# Patient Record
Sex: FEMALE | Race: WHITE
[De-identification: names, ages, dates, MRNs, and addresses within clinical notes are randomized per-mention and may not be internally consistent; named-entity substitution may affect disease eponyms.]

---

## 2020-06-12 ENCOUNTER — HOSPITAL ENCOUNTER (OUTPATIENT)
Dept: HOSPITAL 62 - LC | Age: 25
Discharge: HOME | End: 2020-06-12
Attending: OBSTETRICS & GYNECOLOGY
Payer: COMMERCIAL

## 2020-06-12 DIAGNOSIS — R10.32: ICD-10-CM

## 2020-06-12 DIAGNOSIS — O99.89: Primary | ICD-10-CM

## 2020-06-12 DIAGNOSIS — Z3A.23: ICD-10-CM

## 2020-06-12 LAB
APPEARANCE UR: (no result)
APTT PPP: YELLOW S
BARBITURATES UR QL SCN: NEGATIVE
BILIRUB UR QL STRIP: NEGATIVE
GLUCOSE UR STRIP-MCNC: NEGATIVE MG/DL
KETONES UR STRIP-MCNC: NEGATIVE MG/DL
METHADONE UR QL SCN: NEGATIVE
NITRITE UR QL STRIP: NEGATIVE
PCP UR QL SCN: NEGATIVE
PH UR STRIP: 7 [PH] (ref 5–9)
PROT UR STRIP-MCNC: NEGATIVE MG/DL
SP GR UR STRIP: 1.01
URINE AMPHETAMINES SCREEN: NEGATIVE
URINE BENZODIAZEPINES SCREEN: NEGATIVE
URINE COCAINE SCREEN: NEGATIVE
URINE MARIJUANA (THC) SCREEN: NEGATIVE
UROBILINOGEN UR-MCNC: NEGATIVE MG/DL (ref ?–2)

## 2020-06-12 PROCEDURE — 81001 URINALYSIS AUTO W/SCOPE: CPT

## 2020-06-12 PROCEDURE — 80307 DRUG TEST PRSMV CHEM ANLYZR: CPT

## 2020-09-02 ENCOUNTER — HOSPITAL ENCOUNTER (OUTPATIENT)
Dept: HOSPITAL 62 - LC | Age: 25
Discharge: HOME | End: 2020-09-02
Attending: OBSTETRICS & GYNECOLOGY
Payer: COMMERCIAL

## 2020-09-02 DIAGNOSIS — O47.03: Primary | ICD-10-CM

## 2020-09-02 DIAGNOSIS — Z3A.35: ICD-10-CM

## 2020-09-02 LAB
ADD MANUAL MICROSCOPIC: YES
APPEARANCE UR: CLEAR
APTT PPP: (no result) S
BARBITURATES UR QL SCN: NEGATIVE
BILIRUB UR QL STRIP: NEGATIVE
GLUCOSE UR STRIP-MCNC: NEGATIVE MG/DL
KETONES UR STRIP-MCNC: NEGATIVE MG/DL
METHADONE UR QL SCN: NEGATIVE
NITRITE UR QL STRIP: NEGATIVE
PCP UR QL SCN: NEGATIVE
PH UR STRIP: 7 [PH] (ref 5–9)
PROT UR STRIP-MCNC: NEGATIVE MG/DL
RBC #/AREA URNS HPF: (no result) /HPF
SP GR UR STRIP: 1
URINE AMPHETAMINES SCREEN: NEGATIVE
URINE BENZODIAZEPINES SCREEN: NEGATIVE
URINE COCAINE SCREEN: NEGATIVE
URINE MARIJUANA (THC) SCREEN: NEGATIVE
UROBILINOGEN UR-MCNC: NEGATIVE MG/DL (ref ?–2)

## 2020-09-02 PROCEDURE — 81001 URINALYSIS AUTO W/SCOPE: CPT

## 2020-09-02 PROCEDURE — 59025 FETAL NON-STRESS TEST: CPT

## 2020-09-02 PROCEDURE — 80307 DRUG TEST PRSMV CHEM ANLYZR: CPT

## 2020-09-02 NOTE — NON STRESS TEST REPORT
=================================================================

Non Stress Test

=================================================================

Datetime Report Generated by CPN: 09/02/2020 20:46

   

   

=================================================================

DEMOGRAPHIC

=================================================================

   

EGA NST:  35.4

   

=================================================================

INDICATION

=================================================================

   

Indication for Study (NST) Other:  Labor Check - Contractions

   

=================================================================

VITAL SIGNS

=================================================================

   

Pulse - NST:  82

RESP - NST:  17

NBPSYS NST:  108

   

=================================================================

MONITORING

=================================================================

   

Monitor Explained:  Monitor Explained; Test Explained; Patient

   Verbalized Understanding

Time on Monitor:  09/02/2020 17:52

Time off Monitor:  09/02/2020 20:13

   

=================================================================

NST INTERVENTIONS

=================================================================

   

NST Interventions:  PO Hydration

Physician Notified NST:  Dr. Bhagat

BABY A:  [id number]

   

=================================================================

BABY A

=================================================================

   

Fetal Movement :  Present

Contraction Frequency :  3-7

FHR Baseline :  130

Accelerations :  15X15

Decelerations :  None

Variability :  Moderate 6-25bpm

NST Review:  Meets Criteria for Reactive NST

NST Review and Verified By :  FRANDY Fuentes RN

NST Results:  Reactive

   

=================================================================

NST REPORT

=================================================================

   

Report Trigger:  Send Report

## 2020-09-21 ENCOUNTER — HOSPITAL ENCOUNTER (OUTPATIENT)
Dept: HOSPITAL 62 - LC | Age: 25
Discharge: HOME | End: 2020-09-21
Attending: OBSTETRICS & GYNECOLOGY
Payer: COMMERCIAL

## 2020-09-21 DIAGNOSIS — Z3A.36: ICD-10-CM

## 2020-09-21 DIAGNOSIS — O36.8130: Primary | ICD-10-CM

## 2020-09-21 DIAGNOSIS — Z02.83: ICD-10-CM

## 2020-09-21 LAB
APPEARANCE UR: CLEAR
APTT PPP: (no result) S
BARBITURATES UR QL SCN: NEGATIVE
BILIRUB UR QL STRIP: NEGATIVE
GLUCOSE UR STRIP-MCNC: NEGATIVE MG/DL
KETONES UR STRIP-MCNC: NEGATIVE MG/DL
METHADONE UR QL SCN: NEGATIVE
NITRITE UR QL STRIP: NEGATIVE
PCP UR QL SCN: NEGATIVE
PH UR STRIP: 8 [PH] (ref 5–9)
PROT UR STRIP-MCNC: NEGATIVE MG/DL
SP GR UR STRIP: 1.01
URINE AMPHETAMINES SCREEN: NEGATIVE
URINE BENZODIAZEPINES SCREEN: NEGATIVE
URINE COCAINE SCREEN: NEGATIVE
URINE MARIJUANA (THC) SCREEN: NEGATIVE
UROBILINOGEN UR-MCNC: NEGATIVE MG/DL (ref ?–2)

## 2020-09-21 PROCEDURE — 80307 DRUG TEST PRSMV CHEM ANLYZR: CPT

## 2020-09-21 PROCEDURE — 84112 EVAL AMNIOTIC FLUID PROTEIN: CPT

## 2020-09-21 PROCEDURE — 59025 FETAL NON-STRESS TEST: CPT

## 2020-09-21 PROCEDURE — 81005 URINALYSIS: CPT

## 2020-09-21 NOTE — NON STRESS TEST REPORT
=================================================================

Non Stress Test

=================================================================

Datetime Report Generated by CPN: 09/21/2020 23:00

   

   

=================================================================

DEMOGRAPHIC

=================================================================

   

EGA NST:  38.2

   

=================================================================

INDICATION

=================================================================

   

Indication for Study (NST) Other:  decreased fetal movement

   

=================================================================

MONITORING

=================================================================

   

Monitor Explained:  Monitor Explained; Test Explained; Patient

   Verbalized Understanding

Time on Monitor:  09/21/2020 21:45

Time off Monitor:  09/21/2020 22:50

NST Duration:  65

   

=================================================================

NST INTERVENTIONS

=================================================================

   

NST Interventions:  PO Hydration; Reposition Patient

Physician Notified NST:  Dr. Rivero

BABY A:  [id number]

   

=================================================================

BABY A

=================================================================

   

Fetal Movement :  Present

Contraction Frequency :  irregular

FHR Baseline :  120

Accelerations :  15X15

Decelerations :  None

Variability :  Moderate 6-25bpm

NST Review:  Meets Criteria for Reactive NST

NST Review and Verified By :  LOREN Bellavaodilia RN

NST Results:  Reactive

   

=================================================================

NST REPORT

=================================================================

   

Report Trigger:  Send Report

## 2020-09-25 ENCOUNTER — HOSPITAL ENCOUNTER (OUTPATIENT)
Dept: HOSPITAL 62 - LC | Age: 25
Discharge: HOME | End: 2020-09-25
Attending: OBSTETRICS & GYNECOLOGY
Payer: COMMERCIAL

## 2020-09-25 DIAGNOSIS — O47.1: Primary | ICD-10-CM

## 2020-09-25 DIAGNOSIS — Z3A.38: ICD-10-CM

## 2020-09-25 LAB
APPEARANCE UR: CLEAR
APTT PPP: (no result) S
BARBITURATES UR QL SCN: NEGATIVE
BILIRUB UR QL STRIP: NEGATIVE
GLUCOSE UR STRIP-MCNC: 50 MG/DL
KETONES UR STRIP-MCNC: NEGATIVE MG/DL
METHADONE UR QL SCN: NEGATIVE
NITRITE UR QL STRIP: NEGATIVE
PCP UR QL SCN: NEGATIVE
PH UR STRIP: 7 [PH] (ref 5–9)
PROT UR STRIP-MCNC: NEGATIVE MG/DL
SP GR UR STRIP: 1
URINE AMPHETAMINES SCREEN: NEGATIVE
URINE BENZODIAZEPINES SCREEN: NEGATIVE
URINE COCAINE SCREEN: NEGATIVE
URINE MARIJUANA (THC) SCREEN: NEGATIVE
UROBILINOGEN UR-MCNC: NEGATIVE MG/DL (ref ?–2)

## 2020-09-25 PROCEDURE — 84112 EVAL AMNIOTIC FLUID PROTEIN: CPT

## 2020-09-25 PROCEDURE — 81005 URINALYSIS: CPT

## 2020-09-25 PROCEDURE — 80307 DRUG TEST PRSMV CHEM ANLYZR: CPT

## 2020-10-10 ENCOUNTER — HOSPITAL ENCOUNTER (INPATIENT)
Dept: HOSPITAL 62 - LC | Age: 25
LOS: 3 days | Discharge: HOME | End: 2020-10-13
Attending: OBSTETRICS & GYNECOLOGY | Admitting: OBSTETRICS & GYNECOLOGY
Payer: COMMERCIAL

## 2020-10-10 DIAGNOSIS — Z3A.41: ICD-10-CM

## 2020-10-10 DIAGNOSIS — M79.7: ICD-10-CM

## 2020-10-10 LAB
ADD MANUAL DIFF: NO
APPEARANCE UR: (no result)
APTT PPP: (no result) S
BARBITURATES UR QL SCN: NEGATIVE
BASOPHILS # BLD AUTO: 0 10^3/UL (ref 0–0.2)
BASOPHILS NFR BLD AUTO: 0.4 % (ref 0–2)
BILIRUB UR QL STRIP: NEGATIVE
EOSINOPHIL # BLD AUTO: 0 10^3/UL (ref 0–0.6)
EOSINOPHIL NFR BLD AUTO: 0.2 % (ref 0–6)
ERYTHROCYTE [DISTWIDTH] IN BLOOD BY AUTOMATED COUNT: 15.2 % (ref 11.5–14)
GLUCOSE UR STRIP-MCNC: NEGATIVE MG/DL
HCT VFR BLD CALC: 33.3 % (ref 36–47)
HGB BLD-MCNC: 11.5 G/DL (ref 12–15.5)
KETONES UR STRIP-MCNC: NEGATIVE MG/DL
LYMPHOCYTES # BLD AUTO: 1.5 10^3/UL (ref 0.5–4.7)
LYMPHOCYTES NFR BLD AUTO: 17.3 % (ref 13–45)
MCH RBC QN AUTO: 32.7 PG (ref 27–33.4)
MCHC RBC AUTO-ENTMCNC: 34.5 G/DL (ref 32–36)
MCV RBC AUTO: 95 FL (ref 80–97)
METHADONE UR QL SCN: NEGATIVE
MONOCYTES # BLD AUTO: 0.5 10^3/UL (ref 0.1–1.4)
MONOCYTES NFR BLD AUTO: 6.2 % (ref 3–13)
NEUTROPHILS # BLD AUTO: 6.5 10^3/UL (ref 1.7–8.2)
NEUTS SEG NFR BLD AUTO: 75.9 % (ref 42–78)
NITRITE UR QL STRIP: NEGATIVE
PCP UR QL SCN: NEGATIVE
PH UR STRIP: 7 [PH] (ref 5–9)
PLATELET # BLD: 117 10^3/UL (ref 150–450)
PROT UR STRIP-MCNC: NEGATIVE MG/DL
RBC # BLD AUTO: 3.51 10^6/UL (ref 3.72–5.28)
SP GR UR STRIP: 1
TOTAL CELLS COUNTED % (AUTO): 100 %
URINE AMPHETAMINES SCREEN: NEGATIVE
URINE BENZODIAZEPINES SCREEN: NEGATIVE
URINE COCAINE SCREEN: NEGATIVE
URINE MARIJUANA (THC) SCREEN: NEGATIVE
UROBILINOGEN UR-MCNC: NEGATIVE MG/DL (ref ?–2)
WBC # BLD AUTO: 8.6 10^3/UL (ref 4–10.5)

## 2020-10-10 PROCEDURE — 81005 URINALYSIS: CPT

## 2020-10-10 PROCEDURE — 85027 COMPLETE CBC AUTOMATED: CPT

## 2020-10-10 PROCEDURE — 80307 DRUG TEST PRSMV CHEM ANLYZR: CPT

## 2020-10-10 PROCEDURE — 94760 N-INVAS EAR/PLS OXIMETRY 1: CPT

## 2020-10-10 PROCEDURE — 86900 BLOOD TYPING SEROLOGIC ABO: CPT

## 2020-10-10 PROCEDURE — 36415 COLL VENOUS BLD VENIPUNCTURE: CPT

## 2020-10-10 PROCEDURE — 86850 RBC ANTIBODY SCREEN: CPT

## 2020-10-10 PROCEDURE — 85025 COMPLETE CBC W/AUTO DIFF WBC: CPT

## 2020-10-10 PROCEDURE — 86901 BLOOD TYPING SEROLOGIC RH(D): CPT

## 2020-10-10 PROCEDURE — 86592 SYPHILIS TEST NON-TREP QUAL: CPT

## 2020-10-10 RX ADMIN — SODIUM CHLORIDE, SODIUM LACTATE, POTASSIUM CHLORIDE, AND CALCIUM CHLORIDE PRN MLS/HR: .6; .31; .03; .02 INJECTION, SOLUTION INTRAVENOUS at 18:18

## 2020-10-10 RX ADMIN — SODIUM CHLORIDE, SODIUM LACTATE, POTASSIUM CHLORIDE, AND CALCIUM CHLORIDE PRN MLS/HR: .6; .31; .03; .02 INJECTION, SOLUTION INTRAVENOUS at 16:17

## 2020-10-10 RX ADMIN — SODIUM CHLORIDE, SODIUM LACTATE, POTASSIUM CHLORIDE, AND CALCIUM CHLORIDE PRN MLS/HR: .6; .31; .03; .02 INJECTION, SOLUTION INTRAVENOUS at 22:35

## 2020-10-11 PROCEDURE — 0HQ9XZZ REPAIR PERINEUM SKIN, EXTERNAL APPROACH: ICD-10-PCS | Performed by: OBSTETRICS & GYNECOLOGY

## 2020-10-11 RX ADMIN — AMPICILLIN SODIUM AND SULBACTAM SODIUM SCH MLS/HR: 1; .5 INJECTION, POWDER, FOR SOLUTION INTRAMUSCULAR; INTRAVENOUS at 14:37

## 2020-10-11 RX ADMIN — FAMOTIDINE SCH MG: 20 TABLET, FILM COATED ORAL at 09:13

## 2020-10-11 RX ADMIN — FERROUS SULFATE TAB 325 MG (65 MG ELEMENTAL FE) SCH MG: 325 (65 FE) TAB at 09:13

## 2020-10-11 RX ADMIN — AMPICILLIN SODIUM AND SULBACTAM SODIUM SCH MLS/HR: 1; .5 INJECTION, POWDER, FOR SOLUTION INTRAMUSCULAR; INTRAVENOUS at 09:14

## 2020-10-11 RX ADMIN — FERROUS SULFATE TAB 325 MG (65 MG ELEMENTAL FE) SCH MG: 325 (65 FE) TAB at 18:40

## 2020-10-11 RX ADMIN — BENZOCAINE AND MENTHOL PRN SPR: 20; .5 SPRAY TOPICAL at 09:23

## 2020-10-11 RX ADMIN — IBUPROFEN SCH MG: 800 TABLET, FILM COATED ORAL at 21:59

## 2020-10-11 RX ADMIN — DOCUSATE SODIUM SCH MG: 100 CAPSULE, LIQUID FILLED ORAL at 09:13

## 2020-10-11 RX ADMIN — IBUPROFEN SCH MG: 800 TABLET, FILM COATED ORAL at 14:37

## 2020-10-11 RX ADMIN — IBUPROFEN SCH MG: 800 TABLET, FILM COATED ORAL at 05:04

## 2020-10-11 RX ADMIN — Medication SCH CAP: at 09:13

## 2020-10-11 RX ADMIN — FAMOTIDINE SCH MG: 20 TABLET, FILM COATED ORAL at 21:59

## 2020-10-11 RX ADMIN — DOCUSATE SODIUM SCH MG: 100 CAPSULE, LIQUID FILLED ORAL at 18:39

## 2020-10-11 RX ADMIN — SENNOSIDES, DOCUSATE SODIUM SCH EACH: 50; 8.6 TABLET, FILM COATED ORAL at 09:13

## 2020-10-11 NOTE — BIRTH CERTIFICATE DATA
=================================================================

Birth Cert Data

=================================================================

Datetime Report Generated by CPCU[facility]: 10/11/2020 06:05

   

   

=================================================================

BIRTH CERTIFICATE DATA

=================================================================

   

 47a. Prenatal Care:  Yes    (2020 17:16:Brionna Fuentes RN)

 47b. Date of First Visit:  2020 00:00    (2020

   17:16:Francia Pack RN)

 47c. Date of Last Visit:  10/09/2020 00:00    (2020 17:16:Francia Pack RN)

 47d. Number of Prenatal Visits:  14    (2020 17:16:Francia Pack RN)

 48a. Number of Prev Live Births:  0    (2020 17:16:Brionna Fuentes RN)

 48b. Now Livin    (2020 17:16:Johanna Livingston RN)

 48c. Live Births Now Dead:  0    (2020 17:16:QS system process)

 48e. Pregnancy Losses:  0    (2020 17:16:Brionna Fuentes RN)

   

=================================================================

RISK FACTORS IN THIS PREGNANCY

=================================================================

   

 49a. Diabetes:  No    (2020 17:16:Francia Pack RN)

 49b. Hypertension:  No    (2020 17:16:Francia Pack RN)

 49c. Previous  Births:  0    (2020 17:16:Johanna Livingston RN)

 49d. Stillborns:  No    (2020 17:16:Francia Pack RN)

 49d. IUGR:  No    (2020 17:16:Francia Pack RN)

 49e. Infertility Treatment:  No    (2020 17:16:Francia Pack RN)

 49f. Previous Cesareans:  0    (2020 17:16:Brionna Fuentes RN)

   

=================================================================

Mother's Height

=================================================================

   

 50b. Height Inches:  64    (10/11/2020 06:00:QS system process)

   

=================================================================

Mother's Weight 

=================================================================

   

 51a. Pre-Pregnancy Weight (lbs):  122    (2020 17:16:Francia Pack RN)

 51b. Weight at Delivery (lbs):  167    (10/11/2020 06:00:QS system

   process)

 52. Dt Last Normal Menses Began:  2019 00:00    (2020

   17:16:Francia Pack RN)

   

=================================================================

Infections Present/Treated

=================================================================

   

 53a. Gonorrhea:  No    (2020 17:16:Francia Pack RN)

 Results this Hospital Visit :  Negative    (2020 17:16:Francia Pack RN)

 53b. Syphilis:  No    (2020 17:16:Francia Pack RN)

 53c. Chlamydia:  No    (2020 17:16:Francia Pack RN)

 Results this Hospital Visit:  Negative    (2020 17:16:Francia Pack RN)

 53d. Hepatitis B:  No    (2020 17:16:Francia Pack RN)

 Results this Hospital Visit:  Negative    (2020 17:16:Ana Mcgrath RN)

 53e. Hepatitis C:  Negative    (2020 17:16:Francia Pack RN)

 53h. Mother Tested for HBsAG:  Yes    (2020 17:16:Francia Pack RN)

 53i. Date Tested:  2020 00:00    (2020 17:16:Francia Pack RN)

 53j. Test Result:  Negative    (2020 17:16:Ana Mcgrath RN)

   

=================================================================

Obstetric Procedures 

=================================================================

   

 54a, b, c. Obstetric Procedures:  Ultrasound    (2020

   17:16:Francia Pack RN)

   

=================================================================

Cigarette Smoking 

=================================================================

   

 Cigarette Smoking:  Never Smoker. [id number]    (2020

   17:16:Brionna Fuentes RN)

 55a. 3 Months Before Preg - Ci    (2020 17:16:Brionna Fuentes RN)

 55b. 1st Trimester of Preg- Ci    (2020 17:16:Brionna Fuentes RN)

 55c. 2nd Trimester of Preg- Ci    (2020 17:16:Brionna Fuentes RN)

 55d. 3rd Trimester of Preg- Ci    (2020 17:16:Brionna Fuentes RN)

   

=================================================================

Onset of Labor

=================================================================

   

 56a. PROM >12 Hrs:  14.30    (10/10/2020 15:26:QS system process)

 56b. Precipitous Labor <3 Hrs:  12    (2020 17:16:QS system

   process)

 56c. Prolonged Labor > 20 Hrs:  12    (2020 17:16:QS system

   process)

   

=================================================================



=================================================================

   

 57a. Induction of Labor:  N/A    (2020 17:16:Ira Fraser RN)

 57c. Non-Vertex Presentation A:  Vertex    (2020 17:16:Ira Fraser RN)

 57d. Steroids - Fetal Lung Mat:  None    (2020 17:16:Ira Fraser RN)

 57d. Steroids - Fetal Lung Mat:  Not Applicable    (2020

   17:16:Ira Fraser RN)

 57g. Moderate/Heavy Meconium:  Clear    (10/10/2020 15:26:Francia Pack RN)

 57h. Fetal Intolerance of Labor:  N/A    (2020 17:16:Ira Fraser RN)

:  N/A    (2020 17:16:Ira Fraser RN)

 57i. Epidural/Spinal Anesthesia:  Epidural    (2020

   17:16:Ira Fraser RN)

   

=================================================================

Method of Delivery

=================================================================

   

 58a. Forceps - Unsuccessful A:  N/A    (2020 17:16:Lynnette Barbour RN)

 58b. Vacuum - Unsuccessful A:  Successful    (2020 17:16:Ira Fraser RN)

   

=================================================================

58c. Presentation at Birth

=================================================================

   

 58c. Presentation at Birth - A :  Vertex    (2020 17:16:Ira Fraser RN)

 58c. Presentation at Birth - A :  N/A    (2020 17:16:Ira Fraser RN)

 58c. Presentation at Birth - A :  Cephalic    (10/11/2020

   00:23:Renate Mancuso RN)

   

=================================================================

Final Route and Method of Del 

=================================================================

   

 58d. Baby A Route/Delivery:  Vaginal    (10/11/2020 03:47:Renate Mancuso RN)

 58e. Trial of Labor Attempted:  No    (2020 17:16:Ira Fraser RN)

 58e. Trial of Labor Attempted A:  N/A    (2020 17:16:Ira Fraser RN)

 58e. Trial of Labor Attempted B:  N/A    (2020 17:16:Ira Fraser RN)

   

=================================================================

Maternal Morbidity

=================================================================

   

 59b. 3rd or 4th Degree Lacs:  Perineal; Vaginal    (2020

   17:16:Victor M Rivero MD ([facility]))

   

=================================================================

61. GA at Delivery 

=================================================================

   

 Baby A:  41.1    (2020 17:16:Ira Fraser RN)

:  Late Term-  41- 41.6 Weeks    (2020 17:16:QS system process)

   

=================================================================

62a. APGAR 5 Minute

=================================================================

   

 Baby A:  9    (2020 17:16:QS system process)

## 2020-10-11 NOTE — ADMISSION PHYSICAL
=================================================================



=================================================================

Datetime Report Generated by CPN: 10/11/2020 00:46

   

   

=================================================================

CURRENT ADMISSION

=================================================================

   

Chief Complaint:  Uterine Contractions; Suspected Ruptured Membranes

Indication for Induction:  Not Applicable

Admit Impression :  Term, Intrauterine Pregnancy

Admit Plan:  Admit to Unit; Initiate Labor Protocol

   

=================================================================

ALLERGIES

=================================================================

   

Medication Allergies:  No

Medication Allergies:  No Known Allergies (10/10/2020)

Latex:  No Latex Allergies

Food Allergies:  none

Environmental Allergies:  none 

   

=================================================================

OBSTETRICAL HISTORY

=================================================================

   

EDC:  10/03/2020 00:00

:  1

Para:  0

Term:  0

:  0

SAB:  0

IAB:  0

Ectopic:  0

Livin

Cesareans:  0

VBACs:  0

Multiple Births:  0

Gestational Diabetes:  No

Rh Sensitization:  No

Incompetent Cervix:  No

KATHERINE:  No

Infertility:  No

ART Treatment:  No

Uterine Anomaly:  No

IUGR:  No

Hx Previous C/S:  No

Macrosomia:  No

Hx Loss/Stillborn:  No

PIH:  No

Hx  Death:  No

Placenta Previa/Abruption:  No

Depression/PP Depression:  Yes

PTL/PROM:  No

Post Partum Hemorrhage:  No

Current Pregnancy Procedures:  Ultrasound

Obstetrical History Comments:  G1- current pregnancy

   

=================================================================

***SEE PRENATAL RECORDS***

=================================================================

   

Alcohol:  No

Marijuana :  No

Cocaine:  No

Other Illicit Drugs:  No

Cigarettes:  Never Smoker. [id number]

   

=================================================================

MEDICAL HISTORY

=================================================================

   

Diabetes:  No

Blood Transfusion:  No

Pulmonary Disease (Asthma, TB):  No

Breast Disease:  No

Hypertension:  No

Gyn Surgery:  No

Heart Disease:  No

Hosp/Surgery:  No

Autoimmune Disorder:  No

Anesthetic Complications:  No

Kidney Disease:  No

Abnormal Pap Smear:  No

Neuro/Epilepsy:  No

Psychiatric Disorders:  No

Other Medical Diseases:  Yes

Hepatitis/Liver Disease:  No

Significant Family History:  No

Varicosities/Phlebitis:  No

Trauma/Violence :  No

Thyroid Dysfunction:  No

Medical History Comments:  fibromyalgia and depression

   

=================================================================

INFECTIOUS HISTORY

=================================================================

   

Gonorrhea:  No

Genital Herpes:  No

Chlamydia:  No

Tuberculosis:  No

Syphilis:  No

Hepatitis:  No

HIV/AIDS Exposure:  No

Rash or Viral Illness:  No

HPV:  No

   

=================================================================

PHYSICAL EXAM

=================================================================

   

General:  Normal

HEENT:  Normal

Neurologic:  Normal

Thyroid:  Normal

Heart:  Normal

Lungs:  Normal

Breast:  Deferred

Back:  Normal

Abdomen:  Normal

Genitourinary Exam:  Normal

Extremities:  Normal

DTRs:  Normal

Pelvic Type:  Adequate

   

=================================================================

FETUS A

=================================================================

   

EGA:  41.1

   

=================================================================

PLANS FOR LABOR AND DELIVERY

=================================================================

   

Labor and Delivery:  None

Pain Management:  Medications; Epidural

Feeding Preference:  Breast

Benefit of Breast Feed Discussed:  Yes

Circumcision:  N/A

   

=================================================================

INFORMED CONSENT

=================================================================

   

Signature:  Electronically signed by Victor M Rivero MD ([facility]) on

   10/11/2020 at 00:46  with User ID: [email or web address]

## 2020-10-11 NOTE — DELIVERY SUMMARY
=================================================================

Del Sum A-C

=================================================================

Datetime Report Generated by CPN: 10/11/2020 06:05

   

   

=================================================================

DELIVERY PERSONNEL

=================================================================

   

DELIVERY PERSONNEL:  [id number]

Delivery Doctor::  Victor M Rivero MD

Labor and Delivery Nurse::  Renate Mancuso RN

Labor and Delivery Nurse::  Cynthia Melton RN

Nursery Nurse::  Suzanne King RN

Nursery Nurse::  JOE [employer]/CNA:  Alexa Mcgowan, ST

   

=================================================================

MATERNAL INFORMATION

=================================================================

   

Delivery Anesthesia:  Epidural

Medications After Delivery:  Pitocin 30 Units in 500ml NS/D5W

Delivery QBL:  250

Maternal Complications:  Maternal Fever

   

=================================================================

LABOR SUMMARY

=================================================================

   

EDC:  10/03/2020 00:00

No. Babies in Womb:  1

 Attempted:  No

Labor Anesthesia:  Epidural

   

=================================================================

LABOR INFORMATION

=================================================================

   

Reason for Induction:  Not Applicable

Onset of Labor:  10/10/2020 15:14

Complete Dilatation:  10/11/2020 01:20

Oxytocin:  N/A

Group B Beta Strep:  negative

Steroids Given:  None

Reason Steroids Not Administered:  Not Applicable

   

=================================================================

MEMBRANES

=================================================================

   

Membranes Rupture Method:  Spontaneous

Rupture of Membranes:  10/10/2020 13:30

Length of Rupture (hr):  14.30

Amniotic Fluid Color:  Clear

Amniotic Fluid Amount:  Large

Amniotic Fluid Odor:  Normal

   

=================================================================

STAGES OF LABOR

=================================================================

   

Stage 1 hr:  10

Stage 1 min:  6

Stage 2 hr:  2

Stage 2 min:  28

Stage 3 hr:  0

Stage 3 min:  3

Total Time in Labor hr:  12

Total Time in Labor min:  37

   

=================================================================

VAGINAL DELIVERY

=================================================================

   

Episiotomy:  None

Laceration #1:  Perineal; Vaginal

Laceration Extension #1:  First Degree

Laceration Repair:  Not Applicable

Sponge Count Correct:  Yes

Sharps Count Correct:  Yes

   

=================================================================

CSECTION DELIVERY

=================================================================

   

Primary Indication:  N/A

Secondary Indication:  N/A

CSection Incidence:  N/A

Labor:  N/A

Elective:  N/A

CSection Incision:  N/A

   

=================================================================

BABY A INFORMATION

=================================================================

   

Infant Delivery Date/Time:  10/11/2020 03:48

Method of Delivery:  Vaginal

Nurse Controlled Delivery:  No

Born in Route :  No

:  N/A

Forceps:  N/A

Vacuum Extraction:  Successful

Shoulder Dystocia :  No

   

=================================================================

ASSISTED DELIVERY BABY A

=================================================================

   

Indication for Assisted Delivery:  poor maternal effort

Catheter Prior to Procedure:  Yes

Station Vacuum/Forcep Apply:  +1

Vacuum Number of Pulls:  3

Vacuum Number of PopOffs:  3

Vacuum Maximum Pressure Obtained:  green

Reduce Pressure btwn Ctx:  Yes

Type of Forceps:  kiwi

   

=================================================================

PRESENTATION/POSITION BABY A

=================================================================

   

Presentation:  Cephalic

Cephalic Presentation:  Vertex

Vertex Position:  Direct OP

Breech Presentation:  N/A

   

=================================================================

PLACENTA INFORMATION BABY A

=================================================================

   

Placenta Delivery Time :  10/11/2020 03:51

Placenta Method of Delivery:  Spontaneous

Placenta Status:  Delivered

   

=================================================================

APGAR SCORES BABY A

=================================================================

   

Heart Rate 1 min:  >100 bpm

Resp Effort 1 min:  Good Cry

Reflex Irritability 1 min:  Cough or Sneeze or Pulls Away

Muscle Tone 1 min:  Flaccid

Color 1 min:  Body Pink, Extremities Blue

Resuscitation Effort 1 min:  Tactile Stimulation

APGAR SCORE 1 MIN:  7

Heart Rate 5 min:  >100 bpm

Resp Effort 5 min:  Good Cry

Reflex Irritability 5 min:  Cough or Sneeze or Pulls Away

Muscle Tone 5 min:  Active Motion

Color 5 min:  Body Pink, Extremities Blue

APGAR SCORE 5 MIN:  9

   

=================================================================

INFANT INFORMATION BABY A

=================================================================

   

Gestational Age at Delivery:  41.1

Gestational Status:  Late Term-  41- 41.6 Weeks

Infant Outcome :  Liveborn

Infant Condition :  Stable

Infant Sex:  Female

   

=================================================================

IDENTIFICATION BABY A

=================================================================

   

Infant Verification Date/Time:  10/11/2020 04:03

ID Band Number:  [id number]

Mother's Name Verified:  Yes

Infant Medical Record Number:  [id number]

RN Verifying Infant:  Geronimo, A, RN

Additional Verifying Personnel:  KamangelitoalcidesPETRA RN

   

=================================================================

CORD INFORMATION BABY A

=================================================================

   

No. Cord Vessels:  3

Nuchal Cord :  Around Neck x1, Loose

Cord Blood Taken:  Yes-For Storage (Mom's Blood type +)

Infant Suction:  None

   

=================================================================

ASSESSMENT BABY A

=================================================================

   

Infant Complications:  Extended Fetal Tachycardia

Physical Findings at Delivery:  Molding of the Head

Physical Findings- Other:  see initial assessment performed by nursery

   RN

Infant Respirations:  Appears Normal

Skin to Skin:  Yes

Neonatologist/ALS Called :  No

Infant Care By:  ASIA Greene RN and PETRA King RN

Transferred To:  Remains with Mother

   

=================================================================

BABY B INFORMATION

=================================================================

   

 :  N/A

   

=================================================================

SIGNATURES

=================================================================

   

Signature:  Electronically signed by Victor M Rivero MD ([employer]) on

   10/11/2020 at 04:02  with User ID: [email or web address]

## 2020-10-12 LAB
ERYTHROCYTE [DISTWIDTH] IN BLOOD BY AUTOMATED COUNT: 15.1 % (ref 11.5–14)
HCT VFR BLD CALC: 28.4 % (ref 36–47)
HGB BLD-MCNC: 9.7 G/DL (ref 12–15.5)
MCH RBC QN AUTO: 32.8 PG (ref 27–33.4)
MCHC RBC AUTO-ENTMCNC: 34.3 G/DL (ref 32–36)
MCV RBC AUTO: 96 FL (ref 80–97)
PLATELET # BLD: 98 10^3/UL (ref 150–450)
RBC # BLD AUTO: 2.96 10^6/UL (ref 3.72–5.28)
WBC # BLD AUTO: 10 10^3/UL (ref 4–10.5)

## 2020-10-12 RX ADMIN — FERROUS SULFATE TAB 325 MG (65 MG ELEMENTAL FE) SCH MG: 325 (65 FE) TAB at 17:09

## 2020-10-12 RX ADMIN — IBUPROFEN SCH MG: 800 TABLET, FILM COATED ORAL at 13:49

## 2020-10-12 RX ADMIN — Medication SCH CAP: at 09:10

## 2020-10-12 RX ADMIN — IBUPROFEN SCH MG: 800 TABLET, FILM COATED ORAL at 23:26

## 2020-10-12 RX ADMIN — FERROUS SULFATE TAB 325 MG (65 MG ELEMENTAL FE) SCH MG: 325 (65 FE) TAB at 09:10

## 2020-10-12 RX ADMIN — SENNOSIDES, DOCUSATE SODIUM SCH EACH: 50; 8.6 TABLET, FILM COATED ORAL at 09:10

## 2020-10-12 RX ADMIN — DOCUSATE SODIUM SCH MG: 100 CAPSULE, LIQUID FILLED ORAL at 09:10

## 2020-10-12 RX ADMIN — IBUPROFEN SCH MG: 800 TABLET, FILM COATED ORAL at 06:10

## 2020-10-12 RX ADMIN — DOCUSATE SODIUM SCH MG: 100 CAPSULE, LIQUID FILLED ORAL at 17:09

## 2020-10-12 RX ADMIN — FAMOTIDINE SCH MG: 20 TABLET, FILM COATED ORAL at 23:26

## 2020-10-12 RX ADMIN — FAMOTIDINE SCH MG: 20 TABLET, FILM COATED ORAL at 09:10

## 2020-10-12 NOTE — PDOC PROGRESS REPORT
Subjective-OB


Progress Note for:: 10/12/20 - PP day #1, doing well, UOB, voiding, A+, Rubella 

Immune, breastfeeding





Physical Exam (OB)


Vital Signs: 


                                        











Temp Pulse Resp BP Pulse Ox


 


 97.9 F   76   16   98/63 L  97 


 


 10/12/20 08:00  10/12/20 07:18  10/12/20 07:18  10/12/20 07:18  10/12/20 07:18








                                 Intake & Output











 10/11/20 10/12/20 10/13/20





 06:59 06:59 06:59


 


Intake Total 787 50 


 


Output Total 300  


 


Balance 487 50 


 


Weight 75.6 kg  














- General


General Appearance: Appears well, Alert


In distress: None





- PIH/Pre-Eclampsia


DTR's: 2 +


Clonus: Negative


Headache: Absent


Epigastric Pain: No


Visual Changes: No





- Maternal Morbidity


59. Maternal Morbidity (serious complications experinced by the mother 

associated with labor and delivery: None of the above





- Lochia


Lochia Amount: Small 10-25 ml


Lochia Color: Rubra/Red





- Abdomen


Description: Soft


Hernia Present: No


Fundal Description: Firm, Midline


Fundal Height: u/u - u/2





- Respiratory


Respiratory Status: No respiratory distress





- Abdominal


Distension: No distension


Tenderness: Nontender





- Genitourinary


Genitourinary Note: 





voiding





- Extremities


Upper extremity: Normal inspection


Lower extremities: Normal inspection





- Neurological


Cognition: Normal


Orientation: AAOx4





- Psychological


Associated symptoms: Normal affect, Normal mood





- Skin


Skin Temperature: Warm


Skin Moisture: Dry





Assessment and Plan(PN)





- Assessment and Plan


(1)  (normal spontaneous vaginal delivery)


Is this a current diagnosis for this admission?: Yes   


Plan:: 





Ambulation encouraged, Routine PP orders








- Time Spent with Patient


Time with patient: Less than 15 minutes


Medications reviewed and adjusted accordingly: Yes





- Disposition


Anticipated Discharge Disposition: Home, Self Care


Anticipated Discharge Timeframe: within 24 hours

## 2020-10-13 VITALS — DIASTOLIC BLOOD PRESSURE: 52 MMHG | SYSTOLIC BLOOD PRESSURE: 101 MMHG

## 2020-10-13 RX ADMIN — Medication SCH CAP: at 09:36

## 2020-10-13 RX ADMIN — FERROUS SULFATE TAB 325 MG (65 MG ELEMENTAL FE) SCH MG: 325 (65 FE) TAB at 09:36

## 2020-10-13 RX ADMIN — FAMOTIDINE SCH MG: 20 TABLET, FILM COATED ORAL at 09:36

## 2020-10-13 RX ADMIN — SENNOSIDES, DOCUSATE SODIUM SCH EACH: 50; 8.6 TABLET, FILM COATED ORAL at 09:36

## 2020-10-13 RX ADMIN — BENZOCAINE AND MENTHOL PRN SPR: 20; .5 SPRAY TOPICAL at 09:36

## 2020-10-13 RX ADMIN — IBUPROFEN SCH MG: 800 TABLET, FILM COATED ORAL at 05:05

## 2020-10-13 RX ADMIN — DOCUSATE SODIUM SCH MG: 100 CAPSULE, LIQUID FILLED ORAL at 09:36

## 2020-10-13 NOTE — PDOC DISCHARGE SUMMARY
Impression





- Admit/DC Date/PCP


Admission Date/Primary Care Provider: 


  10/10/20 15:24





  





Discharge Date: 10/13/20





- Discharge Diagnosis


(1) Obstetrical laceration


Is this a current diagnosis for this admission?: Yes   





(2)  (normal spontaneous vaginal delivery)


Is this a current diagnosis for this admission?: Yes   





- Additional Information


Discharge Diet: Regular


Discharge Activity: Balance Activity w/Rest, Pelvic Rest


Prescriptions: 


Ibuprofen [Motrin 800 mg Tablet] 800 mg PO Q8HP PRN #60 tablet


 PRN Reason: 


Home Medications: 








Prenatal Vit,Calc76/Iron/Folic [Prenatabs Rx Tablet] 1 each PO DAILY 20 


Ibuprofen [Motrin 800 mg Tablet] 800 mg PO Q8HP PRN #60 tablet 10/13/20 











Hospital Course


59. Maternal Morbidity (serious complications experinced by the mother 

associated with labor and delivery: None of the above





Results


Laboratory Results: 


                                        











WBC  10.0 10^3/uL (4.0-10.5)   10/12/20  07:02    


 


RBC  2.96 10^6/uL (3.72-5.28)  L  10/12/20  07:02    


 


Hgb  9.7 g/dL (12.0-15.5)  L  10/12/20  07:02    


 


Hct  28.4 % (36.0-47.0)  L  10/12/20  07:02    


 


MCV  96 fl (80-97)   10/12/20  07:02    


 


MCH  32.8 pg (27.0-33.4)   10/12/20  07:02    


 


MCHC  34.3 g/dL (32.0-36.0)   10/12/20  07:02    


 


RDW  15.1 % (11.5-14.0)  H  10/12/20  07:02    


 


Plt Count  98 10^3/uL (150-450)  L  10/12/20  07:02    


 


Lymph % (Auto)  17.3 % (13-45)   10/10/20  15:27    


 


Mono % (Auto)  6.2 % (3-13)   10/10/20  15:27    


 


Eos % (Auto)  0.2 % (0-6)   10/10/20  15:27    


 


Baso % (Auto)  0.4 % (0-2)   10/10/20  15:27    


 


Absolute Neuts (auto)  6.5 10^3/uL (1.7-8.2)   10/10/20  15:27    


 


Absolute Lymphs (auto)  1.5 10^3/uL (0.5-4.7)   10/10/20  15:27    


 


Absolute Monos (auto)  0.5 10^3/uL (0.1-1.4)   10/10/20  15:27    


 


Absolute Eos (auto)  0.0 10^3/uL (0.0-0.6)   10/10/20  15:27    


 


Absolute Basos (auto)  0.0 10^3/uL (0.0-0.2)   10/10/20  15:27    


 


Seg Neutrophils %  75.9 % (42-78)   10/10/20  15:27    


 


Urine Color  STRAW   10/10/20  15:09    


 


Urine Appearance  SLIGHTLY-CLOUDY   10/10/20  15:09    


 


Urine pH  7.0  (5.0-9.0)   10/10/20  15:09    


 


Ur Specific Gravity  1.005   10/10/20  15:09    


 


Urine Protein  NEGATIVE mg/dL (NEGATIVE)   10/10/20  15:09    


 


Urine Glucose (UA)  NEGATIVE mg/dL (NEGATIVE)   10/10/20  15:09    


 


Urine Ketones  NEGATIVE mg/dL (NEGATIVE)   10/10/20  15:09    


 


Urine Blood  SMALL  (NEGATIVE)  H  10/10/20  15:09    


 


Urine Nitrite  NEGATIVE  (NEGATIVE)   10/10/20  15:09    


 


Urine Bilirubin  NEGATIVE  (NEGATIVE)   10/10/20  15:09    


 


Urine Urobilinogen  NEGATIVE mg/dL (<2.0)   10/10/20  15:09    


 


Ur Leukocyte Esterase  NEGATIVE  (NEGATIVE)   10/10/20  15:09    


 


Urine Ascorbic Acid  NEGATIVE  (NEGATIVE)   10/10/20  15:09    


 


Urine Opiates Screen  NEGATIVE   10/10/20  15:09    


 


Urine Methadone Screen  NEGATIVE   10/10/20  15:09    


 


Ur Barbiturates Screen  NEGATIVE   10/10/20  15:09    


 


Ur Phencyclidine Scrn  NEGATIVE   10/10/20  15:09    


 


Ur Amphetamines Screen  NEGATIVE   10/10/20  15:09    


 


U Benzodiazepines Scrn  NEGATIVE   10/10/20  15:09    


 


Urine Cocaine Screen  NEGATIVE   10/10/20  15:09    


 


U Marijuana (THC) Screen  NEGATIVE   10/10/20  15:09    


 


RPR  NONREACTIVE  (NONREACTIVE)   10/10/20  15:27    


 


Blood Type  A POSITIVE   10/10/20  15:27    


 


Antibody Screen  NEGATIVE   10/10/20  15:27    














Plan


Plan of Treatment: 


follow up in 4 weeks at [facility] for post partum check